# Patient Record
Sex: FEMALE | Race: BLACK OR AFRICAN AMERICAN
[De-identification: names, ages, dates, MRNs, and addresses within clinical notes are randomized per-mention and may not be internally consistent; named-entity substitution may affect disease eponyms.]

---

## 2019-01-07 ENCOUNTER — HOSPITAL ENCOUNTER (EMERGENCY)
Dept: HOSPITAL 74 - JER | Age: 39
Discharge: HOME | End: 2019-01-07
Payer: COMMERCIAL

## 2019-01-07 VITALS — SYSTOLIC BLOOD PRESSURE: 112 MMHG | DIASTOLIC BLOOD PRESSURE: 90 MMHG | HEART RATE: 82 BPM

## 2019-01-07 VITALS — TEMPERATURE: 98.3 F

## 2019-01-07 VITALS — BODY MASS INDEX: 31.9 KG/M2

## 2019-01-07 DIAGNOSIS — F17.210: ICD-10-CM

## 2019-01-07 DIAGNOSIS — R07.9: ICD-10-CM

## 2019-01-07 DIAGNOSIS — I10: ICD-10-CM

## 2019-01-07 DIAGNOSIS — R00.2: Primary | ICD-10-CM

## 2019-01-07 LAB
ALBUMIN SERPL-MCNC: 3.7 G/DL (ref 3.4–5)
ALP SERPL-CCNC: 116 U/L (ref 45–117)
ALT SERPL-CCNC: 32 U/L (ref 13–61)
ANION GAP SERPL CALC-SCNC: 7 MMOL/L (ref 8–16)
APPEARANCE UR: CLEAR
AST SERPL-CCNC: 19 U/L (ref 15–37)
BASOPHILS # BLD: 0.3 % (ref 0–2)
BILIRUB SERPL-MCNC: 0.2 MG/DL (ref 0.2–1)
BILIRUB UR STRIP.AUTO-MCNC: NEGATIVE MG/DL
BUN SERPL-MCNC: 12 MG/DL (ref 7–18)
CALCIUM SERPL-MCNC: 8.4 MG/DL (ref 8.5–10.1)
CHLORIDE SERPL-SCNC: 112 MMOL/L (ref 98–107)
CO2 SERPL-SCNC: 22 MMOL/L (ref 21–32)
COLOR UR: (no result)
CREAT SERPL-MCNC: 0.7 MG/DL (ref 0.55–1.3)
DEPRECATED RDW RBC AUTO: 14.1 % (ref 11.6–15.6)
EOSINOPHIL # BLD: 1.2 % (ref 0–4.5)
EPITH CASTS URNS QL MICRO: (no result) /HPF
GLUCOSE SERPL-MCNC: 77 MG/DL (ref 74–106)
HCT VFR BLD CALC: 41.5 % (ref 32.4–45.2)
HGB BLD-MCNC: 14.4 GM/DL (ref 10.7–15.3)
INR BLD: 1.13 (ref 0.83–1.09)
KETONES UR QL STRIP: NEGATIVE
LEUKOCYTE ESTERASE UR QL STRIP.AUTO: (no result)
LYMPHOCYTES # BLD: 27.9 % (ref 8–40)
MAGNESIUM SERPL-MCNC: 2.2 MG/DL (ref 1.8–2.4)
MCH RBC QN AUTO: 31.8 PG (ref 25.7–33.7)
MCHC RBC AUTO-ENTMCNC: 34.7 G/DL (ref 32–36)
MCV RBC: 91.8 FL (ref 80–96)
MONOCYTES # BLD AUTO: 4.6 % (ref 3.8–10.2)
NEUTROPHILS # BLD: 66 % (ref 42.8–82.8)
NITRITE UR QL STRIP: NEGATIVE
PH UR: 7 [PH] (ref 5–8)
PHOSPHATE SERPL-MCNC: 4 MG/DL (ref 2.5–4.9)
PLATELET # BLD AUTO: 265 K/MM3 (ref 134–434)
PMV BLD: 8.2 FL (ref 7.5–11.1)
POTASSIUM SERPLBLD-SCNC: 3.5 MMOL/L (ref 3.5–5.1)
PROT SERPL-MCNC: 7.2 G/DL (ref 6.4–8.2)
PROT UR QL STRIP: NEGATIVE
PROT UR QL STRIP: NEGATIVE
PT PNL PPP: 13.4 SEC (ref 9.7–13)
RBC # BLD AUTO: 4.53 M/MM3 (ref 3.6–5.2)
SODIUM SERPL-SCNC: 140 MMOL/L (ref 136–145)
SP GR UR: 1.01 (ref 1.01–1.03)
UROBILINOGEN UR STRIP-MCNC: NEGATIVE MG/DL (ref 0.2–1)
WBC # BLD AUTO: 8.9 K/MM3 (ref 4–10)

## 2019-01-07 NOTE — PDOC
History of Present Illness





- General


Chief Complaint: Chest Pain


Stated Complaint: CHEST PAIN, PALPITATION


Time Seen by Provider: 01/07/19 10:39


History Source: Patient


Exam Limitations: No Limitations





- History of Present Illness


Initial Comments: 





01/07/19 10:48


38 YOF with h/o idiopathic intracranial hypertension on acetazolamide and 

potassium supplements, who p/w 2 weeks of rapid intermittent heart palpitations

, and an onset of left upper anterior nagging chest pain yesterday afternoon 

while she was bending down to pick something up. She notes the pain is up to 7/

10 maximum and is only present when she is repositioning, not while resting. 

She denies change in the pain with breathing. She has never had the pain like 

this in the past, but she does note prior palpitations which have been related 

to her acetazolamide causing her hypokelamia. Not on OCP or hormone therapy, 

denies any chance she may be pregnant.





Past History





- Past Medical History


Allergies/Adverse Reactions: 


 Allergies











Allergy/AdvReac Type Severity Reaction Status Date / Time


 


No Known Allergies Allergy   Verified 01/07/19 10:13











Home Medications: 


Ambulatory Orders





Potassium Citrate [Potassium Citrate ER] 10 meq PO DAILY 01/07/19 


acetaZOLAMIDE [Diamox -] 250 mg PO TID 01/07/19 








COPD: No


CHF: No


HTN: Yes


Other medical history: IDOPATHIC INTRACRAINIAL HTN





- Immunization History


Immunization Up to Date: No





- Suicide/Smoking/Psychosocial Hx


Smoking History: Current every day smoker


Have you smoked in the past 12 months: No


Information on smoking cessation initiated: No


Hx Alcohol Use: No


Drug/Substance Use Hx: No





**Review of Systems





- Review of Systems


Able to Perform ROS?: Yes


Comments:: 





01/07/19 10:56


GEN: no fever, chills, malaise, generalized weakness, or weight change


HEENT: no ear pain, sore throat, vision change, or eye pain


CV: chest pain, palpitations, no lightheadedness, syncope, or edema


RESP: no cough, wheezing, or SOB


GI: no abdominal pain, nausea, vomiting, diarrhea, constipation, or white/black/

bloody stool


: no dysuria, hematuria, incontinence, retention, bleeding, or discharge 


MSK: no neck/back pain, muscle weakness/pain, or joint swelling/pain


NEURO: no headache, seizure, vertigo, numbness, tingling, or focal weakness


PSYCH: no substance use, no behavior change


SKIN: no jaundice, no rash


ROS otherwise negative except as noted in HPI





*Physical Exam





- Vital Signs


 Last Vital Signs











Temp Pulse Resp BP Pulse Ox


 


 98.3 F   96 H  16   123/63   100 


 


 01/07/19 10:10  01/07/19 10:10  01/07/19 10:10  01/07/19 10:10  01/07/19 11:00














- Physical Exam


Comments: 





01/07/19 10:56


GENERAL: a bit nervous but otherwise well-appearing, A/Ox4, no distress, 

answers questions appropriately


HEENT: PERRLA, EOMI, moist mucous membranes


NECK/BACK: no midline ttp, no spinal stepoff or deformity, no hematoma, full ROM

, neck supple


CHEST WALL: no rash, no tenderness to compression, no costal stepoff or 

deformity


CARDIOVASCULAR: regular rate/rhythm, normal S1S2, no MGR, strong peripheral 

pulses, capillary refill <2 seconds, extremities wwp, no edema


LUNGS/RESPIRATORY: no respiratory distress, CTAB


GI/ABDOMEN: symmetric side-to-side, normoactive BS, soft, no ttp, no midline 

pulsatile masses


: no CVA tenderness


EXTREMITIES:  no muscle atrophy, no acute deformity, no edema


SKIN: warm and dry, no pallor, no jaundice, no rash, no bruising, no skin 

breakdown, no cuts, no lesions


NEUROLOGICAL: GCS 15, CN II-XII grossly intact, 5/5 strength proximally and 

distally, no facial droop





**Heart Score/ECG Review





- History


History: Slightly suspicious





- Electrocardiogram


EKG: Normal





- Age


Age: </= 45





- Risk Factors


Risk Factors Heart Score: Yes Hx Obesity


Based on the list above the patient has:: 1-2 risk factors





- Troponin


Troponin: </= normal limit





- Score


Heart Score - Total: 1


  ** #1





01/07/19 10:08


NSR, rate 95, normal axis and intervals, no ischemic ST-T changes





Moderate Sedation





- Procedure Monitoring


Vital Signs: 


Procedure Monitoring Vital Signs











Temperature  98.3 F   01/07/19 10:10


 


Pulse Rate  96 H  01/07/19 10:10


 


Respiratory Rate  16   01/07/19 10:10


 


Blood Pressure  123/63   01/07/19 10:10


 


O2 Sat by Pulse Oximetry (%)  100   01/07/19 11:00











ED Treatment Course





- LABORATORY


CBC & Chemistry Diagram: 


 01/07/19 11:10





 01/07/19 11:10





- ADDITIONAL ORDERS


Additional order review: 


 Laboratory  Results











  01/07/19 01/07/19 01/07/19





  11:10 11:10 11:10


 


PT with INR   


 


INR   


 


Sodium    140


 


Potassium    3.5


 


Chloride    112 H


 


Carbon Dioxide    22


 


Anion Gap    7 L


 


BUN    12


 


Creatinine    0.7


 


Creat Clearance w eGFR    > 60


 


Random Glucose    77


 


Calcium    8.4 L


 


Phosphorus    4.0


 


Magnesium    2.2


 


Total Bilirubin    0.2


 


AST    19


 


ALT    32


 


Alkaline Phosphatase    116


 


Creatine Kinase    118


 


Troponin I    < 0.02


 


Total Protein    7.2


 


Albumin    3.7


 


TSH    1.66


 


Urine Color   Ltyellow 


 


Urine Appearance   Clear 


 


Urine pH   7.0 


 


Ur Specific Gravity   1.014 


 


Urine Protein   Negative 


 


Urine Glucose (UA)   Negative 


 


Urine Ketones   Negative 


 


Urine Blood   Negative 


 


Urine Nitrite   Negative 


 


Urine Bilirubin   Negative 


 


Urine Urobilinogen   Negative 


 


Ur Leukocyte Esterase   Trace 


 


Urine WBC (Auto)   <1 


 


Urine RBC (Auto)   1 


 


Ur Epithelial Cells   Rare 


 


Urine HCG, Qual  Negative  














  01/07/19





  11:10


 


PT with INR  13.40 H


 


INR  1.13 H


 


Sodium 


 


Potassium 


 


Chloride 


 


Carbon Dioxide 


 


Anion Gap 


 


BUN 


 


Creatinine 


 


Creat Clearance w eGFR 


 


Random Glucose 


 


Calcium 


 


Phosphorus 


 


Magnesium 


 


Total Bilirubin 


 


AST 


 


ALT 


 


Alkaline Phosphatase 


 


Creatine Kinase 


 


Troponin I 


 


Total Protein 


 


Albumin 


 


TSH 


 


Urine Color 


 


Urine Appearance 


 


Urine pH 


 


Ur Specific Gravity 


 


Urine Protein 


 


Urine Glucose (UA) 


 


Urine Ketones 


 


Urine Blood 


 


Urine Nitrite 


 


Urine Bilirubin 


 


Urine Urobilinogen 


 


Ur Leukocyte Esterase 


 


Urine WBC (Auto) 


 


Urine RBC (Auto) 


 


Ur Epithelial Cells 


 


Urine HCG, Qual 








 











  01/07/19





  11:10


 


RBC  4.53


 


MCV  91.8


 


MCHC  34.7


 


RDW  14.1


 


MPV  8.2


 


Neutrophils %  66.0


 


Lymphocytes %  27.9


 


Monocytes %  4.6


 


Eosinophils %  1.2


 


Basophils %  0.3














- RADIOLOGY


Radiology Studies Ordered: 














 Category Date Time Status


 


 CHEST PA & LAT [RAD] Stat Radiology  01/07/19 10:47 Completed














- Medications


Given in the ED: 


ED Medications














Discontinued Medications














Generic Name Dose Route Start Last Admin





  Trade Name Freq  PRN Reason Stop Dose Admin


 


Acetaminophen  1,000 mg  01/07/19 10:46  01/07/19 11:17





  Ofirmev Injection -  IVPB  01/07/19 10:47  Not Given





  ONCE ONE   





     





     





     





     














Medical Decision Making





- Medical Decision Making





01/07/19 10:58


Adult Pt p/w chest pain.





 Initial Vital Signs











Temp Pulse Resp BP Pulse Ox


 


 98.3 F   96 H  16   123/63   100 


 


 01/07/19 10:10  01/07/19 10:10  01/07/19 10:10  01/07/19 10:10  01/07/19 10:10








Exam: As noted in Physical Exam section.


DDX IBNLT: most likely musculoskeletal cause of chest pain; less likely but 

must be worked up for ACS, pericarditis, tamponade, aortic dissection, AAA, PTX

, PE, gastritis, PUD, referred abdominal pain, colitis, bowel perforation, PNA/

bronchitis, pleurisy, pleuritis, panic/anxiety, etc


W/U ordered: Labs as noted below EKG CXR


TX ordered: monitor





EKG: Reviewed; results as noted in ECG Review section.


CXR: Nothing acute





Labs: 





Reassessment: Patient states much improved, more comfortable now knowing her w/

u results.


Repeat exam is benign.





Repeat VS: 





DISCHARGE


Repeat cardiac enzymes are negative.


No new abnormal rhythms have been observed on the cardiac monitor.


On last reassessment VS are stable, Pts pain is resolved, and exam is benign.


The Pts HEART score indicates they are low risk and do not require admission 

currently.


The Pt is appropriate for discharge with close outpatient follow up.


They are comfortable with this plan and will follow up with their primary care 

provider in 1-3 days.


Specific return precautions are discussed and they will come back to the ER if 

necessary.





*DC/Admit/Observation/Transfer


Diagnosis at time of Disposition: 


 Palpitations





Chest pain


Qualifiers:


 Chest pain type: unspecified Qualified Code(s): R07.9 - Chest pain, unspecified








- Discharge Dispostion


Disposition: HOME


Condition at time of disposition: Stable


Decision to Admit order: No





- Referrals


Referrals: 


Liam Garcia MD [Staff Physician] - 





- Patient Instructions


Printed Discharge Instructions:  DI for Chest Pain


Additional Instructions: 


You were seen in the ER for chest pain and palpitations. We did lab work on 

your blood and urine, an electrocardiogram, and a chest x-ray, and we did not 

find any concerning abnormalities. Your symptoms improved with the medications 

we gave you in the ER. After our assessment, we do not believe you are having a 

medical emergency at this time, and we believe you are safe to go home. Take 

over the counter pain medications for your pain, as instructed on the 

medication label. Please follow up with your primary care provider in 1-3 days. 

Please also follow up with a cardiologist regarding your palpitations. Call 

their clinic as soon as possible, tell them you were seen in the ER, and tell 

them you need an appointment. If you have any new or worsening symptoms, 

especially worsening chest pain, jaw pain, shoulder/arm pain, shortness of 

breath, sweats, nausea, loss of consciousness, palpitations, or other symptoms, 

please come back to the ER at any time (24 hours a day). If you are having 

severe or life threatening symptoms, or symptoms that make it unsafe to drive 

or have someone drive you, please call 911.





- Post Discharge Activity

## 2019-01-08 NOTE — EKG
Test Reason : 

Blood Pressure : ***/*** mmHG

Vent. Rate : 095 BPM     Atrial Rate : 095 BPM

   P-R Int : 148 ms          QRS Dur : 076 ms

    QT Int : 362 ms       P-R-T Axes : 065 083 055 degrees

   QTc Int : 454 ms

 

NORMAL SINUS RHYTHM

POSSIBLE LEFT ATRIAL ENLARGEMENT

BORDERLINE ECG

NO PREVIOUS ECGS AVAILABLE

Confirmed by Jacob Joseph MD ([id number]) on 1/8/2019 10:04:26 AM

 

Referred By:             Confirmed By:Jacob Joseph MD